# Patient Record
Sex: FEMALE | Race: WHITE
[De-identification: names, ages, dates, MRNs, and addresses within clinical notes are randomized per-mention and may not be internally consistent; named-entity substitution may affect disease eponyms.]

---

## 2018-06-06 ENCOUNTER — HOSPITAL ENCOUNTER (EMERGENCY)
Dept: HOSPITAL 97 - ER | Age: 53
LOS: 1 days | Discharge: HOME | End: 2018-06-07
Payer: SELF-PAY

## 2018-06-06 DIAGNOSIS — M25.561: Primary | ICD-10-CM

## 2018-06-06 PROCEDURE — 99283 EMERGENCY DEPT VISIT LOW MDM: CPT

## 2018-06-07 NOTE — EDPHYS
Physician Documentation                                                                           

 [facility]                                                                

Name: Ban Xiao                                                                             

Age: 53 yrs                                                                                       

Sex: Female                                                                                       

: 1965                                                                                   

MRN: [id number]                                                                                   

Arrival Date: 2018                                                                          

Time: 22:09                                                                                       

Account#: [id number]                                                                            

Bed 11                                                                                            

Private MD: Graciela Rincon K                                                                     

ED Physician Barney Lopez                                                                      

HPI:                                                                                              

                                                                                             

00:30 This 53 yrs old  Female presents to ER via Ambulatory with complaints of Right pm1 

      Knee Pain.                                                                                  

00:30 The patient presents with pain, that is acute. The complaints affect the right knee.    pm1 

      Context: The problem was sustained at home, resulted from Getting up from kneeling          

      position, the patient is able to ambulate, Problem is a result from a previous injury:      

      No. Onset: The symptoms/episode began/occurred today. Modifying factors: The symptoms       

      are alleviated by nothing. the symptoms are aggravated by weight bearing, bending knee.     

      Associated signs and symptoms: Pertinent negatives numbness, tingling. Treatment prior      

      to arrival includes: icing the affected extremity. Severity of symptoms: in the             

      emergency department the symptoms are unchanged. The patient has not experienced            

      similar symptoms in the past. Patient was on the ground on all fours and then when she      

      stood up from kneeling position her right knee went lateral and her upper leg moved         

      medially.                                                                                   

                                                                                                  

OB/GYN:                                                                                           

                                                                                             

22:19 LMP N/A - Birth control method                                                          fc  

                                                                                                  

Historical:                                                                                       

- Allergies:                                                                                      

22:18 No Known Allergies;                                                                     fc  

- Home Meds:                                                                                      

22:18 None [Active];                                                                          fc  

- PMHx:                                                                                           

22:18 Obesity;                                                                                fc  

- PSHx:                                                                                           

22:18 Appendectomy;                                                                           fc  

                                                                                                  

- Immunization history:: Last tetanus immunization: unknown.                                      

- Social history:: Smoking status: Patient/guardian denies using tobacco.                         

- Ebola Screening: : Patient negative for fever greater than or equal to 101.5 degrees            

  Fahrenheit, and additional compatible Ebola Virus Disease symptoms Patient denies               

  exposure to infectious person Patient denies travel to an Ebola-affected area in the            

  21 days before illness onset.                                                                   

                                                                                                  

                                                                                                  

ROS:                                                                                              

                                                                                             

00:30 Constitutional: Negative for fever, chills, and weight loss, Eyes: Negative for injury, pm1 

      pain, redness, and discharge, ENT: Negative for injury, pain, and discharge, Neck:          

      Negative for injury, pain, and swelling, Cardiovascular: Negative for chest pain,           

      palpitations, and edema, Respiratory: Negative for shortness of breath, cough,              

      wheezing, and pleuritic chest pain, Abdomen/GI: Negative for abdominal pain, nausea,        

      vomiting, diarrhea, and constipation, Back: Negative for injury and pain.                   

      Skin: Negative for injury, rash, and discoloration, Neuro: Negative for headache,           

      weakness, numbness, tingling, and seizure.                                                  

      MS/extremity: Positive for pain, of the right knee.                                         

                                                                                                  

Exam:                                                                                             

00:30 Constitutional:  This is a well developed, well nourished patient who is awake, alert,  pm1 

      and in no acute distress. Head/Face:  Normocephalic, atraumatic. Neck:  Trachea             

      midline, no thyromegaly or masses palpated, and no cervical lymphadenopathy.  Supple,       

      full range of motion without nuchal rigidity, or vertebral point tenderness.  No            

      Meningismus. Chest/axilla:  Normal chest wall appearance and motion.  Nontender with no     

      deformity.  No lesions are appreciated. Cardiovascular:  Regular rate and rhythm with a     

      normal S1 and S2.  No gallops, murmurs, or rubs.  Normal PMI, no JVD.  No pulse             

      deficits. Respiratory:  Lungs have equal breath sounds bilaterally, clear to                

      auscultation and percussion.  No rales, rhonchi or wheezes noted.  No increased work of     

      breathing, no retractions or nasal flaring. Abdomen/GI:  Soft, non-tender, with normal      

      bowel sounds.  No distension or tympany.  No guarding or rebound.  No evidence of           

      tenderness throughout. Back:  No spinal tenderness.  No costovertebral tenderness.          

      Full range of motion. Skin:  Warm, dry with normal turgor.  Normal color with no            

      rashes, no lesions, and no evidence of cellulitis.                                          

00:30 Musculoskeletal/extremity: Extremities: grossly normal except: noted in the right knee:     

      tenderness, There is no evidence of  deformity, Circulation is intact in all                

      extremities.                                                                                

                                                                                                  

Vital Signs:                                                                                      

                                                                                             

22:19  / 78; Pulse 79; Resp 18; Temp 99.6(O); Pulse Ox 97% on R/A; Weight 117.93 kg       

      (R); Height 5 ft. 1 in. (154.94 cm) (R); Pain 8/10;                                         

22:19 Body Mass Index 49.12 (117.93 kg, 154.94 cm)                                              

                                                                                                  

MDM:                                                                                              

23:39 Patient medically screened.                                                             [facility] 

                                                                                             

00:09 Data reviewed: vital signs. Data interpreted: Pulse oximetry: on room air is 97 %.      pm1 

      Interpretation: normal. Counseling: I had a detailed discussion with the patient and/or     

      guardian regarding: the historical points, exam findings, and any diagnostic results        

      supporting the discharge/admit diagnosis, radiology results, the need for outpatient        

      follow up, to return to the emergency department if symptoms worsen or persist or if        

      there are any questions or concerns that arise at home.                                     

00:30 ED course: patient not given crutches due to fall risk per patient.                     pm1 

                                                                                                  

                                                                                             

22:22 Order name: Knee Right 3 View XRAY                                                      fc  

                                                                                                  

Administered Medications:                                                                         

00:24 CANCELLED (pt requested Tylenol #3): Norco 5 mg-325 mg 1 tabs PO once                   fc  

00:25 Drug: Tylenol #3 (300 mg-30 mg) 1 tablet Route: PO;                                     fc  

00:48 Follow up: Response: No adverse reaction; Pain is decreased                             fc  

                                                                                                  

                                                                                                  

Disposition:                                                                                      

07:21 Co-signature as Attending Physician, Barney Lopez MD I agree with the assessment and  [facility] 

      plan of care.                                                                               

                                                                                                  

Disposition:                                                                                      

18 00:11 Discharged to Home. Impression: Pain in right knee - possible internal             

  derangement.                                                                                    

- Condition is Stable.                                                                            

- Discharge Instructions: Knee Pain.                                                              

- Prescriptions for Tylenol- Codeine #3 300-30 mg Oral Tablet - take 2 tablets by ORAL            

  route every 6 hours As needed; 20 tablet.                                                       

- Medication Reconciliation Form, Thank You Letter form.                                          

- Follow up: Emergency Department; When: As needed; Reason: Worsening of condition.               

  Follow up: Chris Gregg MD; When: 2 - 3 days; Reason: Recheck today's                     

  complaints, Continuance of care, Re-evaluation by your physician.                               

- Problem is new.                                                                                 

- Symptoms have improved.                                                                         

                                                                                                  

                                                                                                  

                                                                                                  

Signatures:                                                                                       

Dispatcher [employer]                           Barney Welch MD MD cha Chretien, Felicia, RN                   RN   Jose Hdz NP                    NP   pm1                                                  

                                                                                                  

Corrections: (The following items were deleted from the chart)                                    

00:24 00:09 Norco 5 mg-325 mg 1 tabs PO once ordered. pm1                                     fc  

00:50 00:11 2018 00:11 Discharged to Home. Impression: Pain in right knee - possible    fc  

      internal derangement. Condition is Stable. Forms are Medication Reconciliation Form,        

      Thank You Letter, Antibiotic Education, Prescription Opioid Use. Follow up: Emergency       

      Department; When: As needed; Reason: Worsening of condition. Follow up: Chris Gregg; When: 2 - 3 days; Reason: Recheck today's complaints, Continuance of care,       

      Re-evaluation by your physician. Problem is new. Symptoms have improved. pm1                

                                                                                                  

**************************************************************************************************

## 2018-06-07 NOTE — ER
Nurse's Notes                                                                                     

 [facility]                                                                

Name: Bna Xiao                                                                             

Age: 53 yrs                                                                                       

Sex: Female                                                                                       

: 1965                                                                                   

MRN: [id number]                                                                                   

Arrival Date: 2018                                                                          

Time: 22:09                                                                                       

Account#: [id number]                                                                            

Bed 11                                                                                            

Private MD: Graciela Rincon K                                                                     

Diagnosis: Pain in right knee-possible internal derangement                                       

                                                                                                  

Presentation:                                                                                     

                                                                                             

22:15 Presenting complaint: Patient states: that she is having right knee pain. States that   fc  

      she was on her knees cleaning a table and when she got up her knee went one way and she     

      went the other. Transition of care: patient was not received from another setting of        

      care. Onset of symptoms was 2018 at 16:30. Risk Assessment: Do you want to         

      hurt yourself or someone else? Patient reports no desire to harm self or others.            

      Initial Sepsis Screen: Does the patient meet any 2 criteria? No. Patient's initial          

      sepsis screen is negative. Does the patient have a suspected source of infection? No.       

      Patient's initial sepsis screen is negative. Care prior to arrival: Medication(s)           

      given: Naproxen and Tylenol #3 at approx 1700.                                              

22:15 Method Of Arrival: Ambulatory                                                           fc  

22:15 Acuity: JOSE 4                                                                           fc  

                                                                                                  

Triage Assessment:                                                                                

22:18 General: Appears uncomfortable, obese, well groomed, Behavior is calm, cooperative,     fc  

      appropriate for age. Pain: Complains of pain in right knee Pain currently is 0 out of       

      10 on a pain scale. at worst was 8 out of 10 on a pain scale. Quality of pain is            

      described as aching, sharp, throbbing, Pain began at 1630 Is continuous, Aggravated by      

      increased activity, repositioning, weight bearing. EENT: No deficits noted. Neuro:          

      Level of Consciousness is awake, alert, obeys commands, Oriented to person, place,          

      time, situation. Cardiovascular: No deficits noted. Respiratory: No deficits noted. GI:     

      No deficits noted. : No deficits noted. Derm: Skin is pink, warm \T\ dry.                 

      Musculoskeletal: Circulation, motion, and sensation intact. Capillary refill < 3            

      seconds, Range of motion: intact in all extremities, Reports pain in right knee.            

                                                                                                  

OB/GYN:                                                                                           

22:19 LMP N/A - Birth control method                                                          fc  

                                                                                                  

Historical:                                                                                       

- Allergies:                                                                                      

22:18 No Known Allergies;                                                                     fc  

- Home Meds:                                                                                      

22:18 None [Active];                                                                          fc  

- PMHx:                                                                                           

22:18 Obesity;                                                                                fc  

- PSHx:                                                                                           

22:18 Appendectomy;                                                                           fc  

                                                                                                  

- Immunization history:: Last tetanus immunization: unknown.                                      

- Social history:: Smoking status: Patient/guardian denies using tobacco.                         

- Ebola Screening: : Patient negative for fever greater than or equal to 101.5 degrees            

  Fahrenheit, and additional compatible Ebola Virus Disease symptoms Patient denies               

  exposure to infectious person Patient denies travel to an Ebola-affected area in the            

  21 days before illness onset.                                                                   

                                                                                                  

                                                                                                  

Screenin:22 Abuse screen: Denies threats or abuse. Nutritional screening: No deficits noted.        fc  

      Tuberculosis screening: No symptoms or risk factors identified.                             

                                                                                             

00:00 Fall Risk None identified.                                                              fc  

                                                                                                  

Assessment:                                                                                       

                                                                                             

23:24 Reassessment: No changes from previously documented assessment. Patient and/or family   fc  

      updated on plan of care and expected duration. Pain level reassessed. Patient is alert,     

      oriented x 3, equal unlabored respirations, skin warm/dry/pink. no change from triage       

      assessment.                                                                                 

23:40 Reassessment: No changes from previously documented assessment. Patient and/or family   fc  

      updated on plan of care and expected duration. Pain level reassessed. Patient is alert,     

      oriented x 3, equal unlabored respirations, skin warm/dry/pink. Jose LARKIN in to see        

      and examine pt.                                                                             

                                                                                             

00:25 Reassessment: Pt states that the Norco doesn't ever help just makes her high.           fc  

      Requesting Tylenol #3. Discussed with Jose LARKIN and ok to give pt the Tylenol #3.          

                                                                                                  

Vital Signs:                                                                                      

                                                                                             

22:19  / 78; Pulse 79; Resp 18; Temp 99.6(O); Pulse Ox 97% on R/A; Weight 117.93 kg     fc  

      (R); Height 5 ft. 1 in. (154.94 cm) (R); Pain 8/10;                                         

22:19 Body Mass Index 49.12 (117.93 kg, 154.94 cm)                                              

                                                                                                  

ED Course:                                                                                        

22:09 Patient arrived in ED.                                                                  es  

22:09 Graciela Rincon MD is Private Physician.                                                es  

22:17 Triage completed.                                                                       fc  

22:19 Arm band placed on Patient placed in waiting room, Patient notified of wait time.       fc  

22:29 Patient moved to radiology via wheelchair.                                              kc2 

22:34 X-ray completed. Portable x-ray completed in exam room. Patient tolerated procedure     kc2 

      well.                                                                                       

22:35 Knee Right 3 View XRAY In Process Unspecified.                                          EDMS

23:24 No provider procedures requiring assistance completed. Patient did not have IV access   fc  

      during this emergency room visit.                                                           

23:38 Jose Napier NP is PHCP.                                                           pm1 

23:38 Barney Lopez MD is Attending Physician.                                             pm1 

06                                                                                             

00:00 Patient has correct armband on for positive identification. Call light in reach.        fc  

00:10 Chris Gregg MD is Referral Physician.                                            pm1 

                                                                                                  

Administered Medications:                                                                         

00:24 CANCELLED (pt requested Tylenol #3): Norco 5 mg-325 mg 1 tabs PO once                   fc  

00:25 Drug: Tylenol #3 (300 mg-30 mg) 1 tablet Route: PO;                                     fc  

00:48 Follow up: Response: No adverse reaction; Pain is decreased                             fc  

                                                                                                  

                                                                                                  

Outcome:                                                                                          

00:11 Discharge ordered by MD.                                                                pm1 

00:49 Discharged to home via wheelchair, with family.                                         fc  

00:49 Condition: good                                                                             

00:49 Discharge instructions given to patient, family, Instructed on discharge instructions,      

      follow up and referral plans. no drinking with medication, no driving heavy equipment,      

      medication usage, Demonstrated understanding of instructions, follow-up care,               

      medications, Prescriptions given X 1.                                                       

00:50 Patient left the ED.                                                                    fc  

                                                                                                  

Signatures:                                                                                       

Dispatcher [employer]                           EDMS                                                 

Ciera Reyna Felicia, RN                   RN   fc                                                   

Jose Napier NP                    NP   pm1                                                  

Deena Johnson                                 kc2                                                  

                                                                                                  

Corrections: (The following items were deleted from the chart)                                    

[id number]                                                                                             

22:22 22:19 Pulse 79bpm; Resp 18bpm; Pulse Ox 97% RA; Temp 99.6F Oral; 117.93 kg Reported;    fc  

      Height 5 ft. 1 in. Reported; BMI: 49.1; Pain 8/10; fc                                       

                                                                                                  

**************************************************************************************************

## 2018-06-07 NOTE — RAD REPORT
EXAM DESCRIPTION:  RAD - Knee Right 3 View - 6/6/2018 10:37 pm

 

CLINICAL HISTORY:  Knee pain

 

COMPARISON:  None.

 

FINDINGS:  No fracture, dislocation or periosteal reaction.Trace amount of joint fluid present. Thomas
la femoral joint space narrowing present with marginal spurring. No significant medial or lateral com
partment joint space narrowing. There are minimal marginal spurs. No foreign body or other soft tissu
e abnormality.

 

 

IMPRESSION:  Knee degenerative change and trace amount of joint effusion. No acute bone finding.

 

Clinical concerns for internal derangement or occult bony injury could be further assessed with MR im
aging.